# Patient Record
Sex: FEMALE | Race: WHITE | NOT HISPANIC OR LATINO | URBAN - METROPOLITAN AREA
[De-identification: names, ages, dates, MRNs, and addresses within clinical notes are randomized per-mention and may not be internally consistent; named-entity substitution may affect disease eponyms.]

---

## 2020-04-02 ENCOUNTER — INPATIENT (INPATIENT)
Facility: HOSPITAL | Age: 55
LOS: 2 days | Discharge: HOME | DRG: 177 | End: 2020-04-05
Attending: STUDENT IN AN ORGANIZED HEALTH CARE EDUCATION/TRAINING PROGRAM | Admitting: STUDENT IN AN ORGANIZED HEALTH CARE EDUCATION/TRAINING PROGRAM
Payer: COMMERCIAL

## 2020-04-02 VITALS
TEMPERATURE: 100 F | DIASTOLIC BLOOD PRESSURE: 62 MMHG | OXYGEN SATURATION: 95 % | RESPIRATION RATE: 20 BRPM | HEART RATE: 111 BPM | SYSTOLIC BLOOD PRESSURE: 119 MMHG

## 2020-04-02 DIAGNOSIS — R19.7 DIARRHEA, UNSPECIFIED: ICD-10-CM

## 2020-04-02 DIAGNOSIS — R50.9 FEVER, UNSPECIFIED: ICD-10-CM

## 2020-04-02 DIAGNOSIS — U07.1 COVID-19: ICD-10-CM

## 2020-04-02 DIAGNOSIS — B97.21 SARS-ASSOCIATED CORONAVIRUS AS THE CAUSE OF DISEASES CLASSIFIED ELSEWHERE: ICD-10-CM

## 2020-04-02 DIAGNOSIS — J96.91 RESPIRATORY FAILURE, UNSPECIFIED WITH HYPOXIA: ICD-10-CM

## 2020-04-02 DIAGNOSIS — J12.89 OTHER VIRAL PNEUMONIA: ICD-10-CM

## 2020-04-02 DIAGNOSIS — F41.9 ANXIETY DISORDER, UNSPECIFIED: ICD-10-CM

## 2020-04-02 LAB
ALBUMIN SERPL ELPH-MCNC: 3.3 G/DL — SIGNIFICANT CHANGE UP (ref 3.3–5)
ALBUMIN SERPL ELPH-MCNC: 3.8 G/DL — SIGNIFICANT CHANGE UP (ref 3.3–5)
ALP SERPL-CCNC: 66 U/L — SIGNIFICANT CHANGE UP (ref 40–120)
ALP SERPL-CCNC: 78 U/L — SIGNIFICANT CHANGE UP (ref 40–120)
ALT FLD-CCNC: 139 U/L — HIGH (ref 10–45)
ALT FLD-CCNC: 178 U/L — HIGH (ref 10–45)
ANION GAP SERPL CALC-SCNC: 11 MMOL/L — SIGNIFICANT CHANGE UP (ref 5–17)
ANION GAP SERPL CALC-SCNC: 12 MMOL/L — SIGNIFICANT CHANGE UP (ref 5–17)
AST SERPL-CCNC: 107 U/L — HIGH (ref 10–40)
AST SERPL-CCNC: 130 U/L — HIGH (ref 10–40)
BASE EXCESS BLDV CALC-SCNC: 3.9 MMOL/L — SIGNIFICANT CHANGE UP
BASOPHILS # BLD AUTO: 0 K/UL — SIGNIFICANT CHANGE UP (ref 0–0.2)
BASOPHILS # BLD AUTO: 0.01 K/UL — SIGNIFICANT CHANGE UP (ref 0–0.2)
BASOPHILS NFR BLD AUTO: 0 % — SIGNIFICANT CHANGE UP (ref 0–2)
BASOPHILS NFR BLD AUTO: 0.3 % — SIGNIFICANT CHANGE UP (ref 0–2)
BILIRUB SERPL-MCNC: 0.5 MG/DL — SIGNIFICANT CHANGE UP (ref 0.2–1.2)
BILIRUB SERPL-MCNC: 0.5 MG/DL — SIGNIFICANT CHANGE UP (ref 0.2–1.2)
BUN SERPL-MCNC: 8 MG/DL — SIGNIFICANT CHANGE UP (ref 7–23)
BUN SERPL-MCNC: 9 MG/DL — SIGNIFICANT CHANGE UP (ref 7–23)
CA-I SERPL-SCNC: 1.13 MMOL/L — SIGNIFICANT CHANGE UP (ref 1.12–1.3)
CALCIUM SERPL-MCNC: 8 MG/DL — LOW (ref 8.4–10.5)
CALCIUM SERPL-MCNC: 8.6 MG/DL — SIGNIFICANT CHANGE UP (ref 8.4–10.5)
CHLORIDE SERPL-SCNC: 102 MMOL/L — SIGNIFICANT CHANGE UP (ref 96–108)
CHLORIDE SERPL-SCNC: 96 MMOL/L — SIGNIFICANT CHANGE UP (ref 96–108)
CO2 SERPL-SCNC: 24 MMOL/L — SIGNIFICANT CHANGE UP (ref 22–31)
CO2 SERPL-SCNC: 27 MMOL/L — SIGNIFICANT CHANGE UP (ref 22–31)
CREAT SERPL-MCNC: 0.63 MG/DL — SIGNIFICANT CHANGE UP (ref 0.5–1.3)
CREAT SERPL-MCNC: 0.74 MG/DL — SIGNIFICANT CHANGE UP (ref 0.5–1.3)
CRP SERPL-MCNC: 10.57 MG/DL — HIGH (ref 0–0.4)
D DIMER BLD IA.RAPID-MCNC: 217 NG/ML DDU — SIGNIFICANT CHANGE UP
D DIMER BLD IA.RAPID-MCNC: 217 NG/ML DDU — SIGNIFICANT CHANGE UP
EOSINOPHIL # BLD AUTO: 0 K/UL — SIGNIFICANT CHANGE UP (ref 0–0.5)
EOSINOPHIL # BLD AUTO: 0 K/UL — SIGNIFICANT CHANGE UP (ref 0–0.5)
EOSINOPHIL NFR BLD AUTO: 0 % — SIGNIFICANT CHANGE UP (ref 0–6)
EOSINOPHIL NFR BLD AUTO: 0 % — SIGNIFICANT CHANGE UP (ref 0–6)
FERRITIN SERPL-MCNC: 1279 NG/ML — HIGH (ref 15–150)
FERRITIN SERPL-MCNC: 1656 NG/ML — HIGH (ref 15–150)
GAS PNL BLDV: 133 MMOL/L — LOW (ref 138–146)
GAS PNL BLDV: SIGNIFICANT CHANGE UP
GAS PNL BLDV: SIGNIFICANT CHANGE UP
GLUCOSE SERPL-MCNC: 150 MG/DL — HIGH (ref 70–99)
GLUCOSE SERPL-MCNC: 167 MG/DL — HIGH (ref 70–99)
HCO3 BLDV-SCNC: 29 MMOL/L — HIGH (ref 20–27)
HCT VFR BLD CALC: 36.3 % — SIGNIFICANT CHANGE UP (ref 34.5–45)
HCT VFR BLD CALC: 42.2 % — SIGNIFICANT CHANGE UP (ref 34.5–45)
HGB BLD-MCNC: 11.5 G/DL — SIGNIFICANT CHANGE UP (ref 11.5–15.5)
HGB BLD-MCNC: 13.4 G/DL — SIGNIFICANT CHANGE UP (ref 11.5–15.5)
IMM GRANULOCYTES NFR BLD AUTO: 0.5 % — SIGNIFICANT CHANGE UP (ref 0–1.5)
LACTATE SERPL-SCNC: 1.3 MMOL/L — SIGNIFICANT CHANGE UP (ref 0.5–2)
LYMPHOCYTES # BLD AUTO: 0.34 K/UL — LOW (ref 1–3.3)
LYMPHOCYTES # BLD AUTO: 0.95 K/UL — LOW (ref 1–3.3)
LYMPHOCYTES # BLD AUTO: 25.2 % — SIGNIFICANT CHANGE UP (ref 13–44)
LYMPHOCYTES # BLD AUTO: 7.9 % — LOW (ref 13–44)
MAGNESIUM SERPL-MCNC: 2 MG/DL — SIGNIFICANT CHANGE UP (ref 1.6–2.6)
MCHC RBC-ENTMCNC: 29.2 PG — SIGNIFICANT CHANGE UP (ref 27–34)
MCHC RBC-ENTMCNC: 29.2 PG — SIGNIFICANT CHANGE UP (ref 27–34)
MCHC RBC-ENTMCNC: 31.7 GM/DL — LOW (ref 32–36)
MCHC RBC-ENTMCNC: 31.8 GM/DL — LOW (ref 32–36)
MCV RBC AUTO: 91.9 FL — SIGNIFICANT CHANGE UP (ref 80–100)
MCV RBC AUTO: 92.1 FL — SIGNIFICANT CHANGE UP (ref 80–100)
MONOCYTES # BLD AUTO: 0.08 K/UL — SIGNIFICANT CHANGE UP (ref 0–0.9)
MONOCYTES # BLD AUTO: 0.09 K/UL — SIGNIFICANT CHANGE UP (ref 0–0.9)
MONOCYTES NFR BLD AUTO: 1.8 % — LOW (ref 2–14)
MONOCYTES NFR BLD AUTO: 2.4 % — SIGNIFICANT CHANGE UP (ref 2–14)
NEUTROPHILS # BLD AUTO: 2.7 K/UL — SIGNIFICANT CHANGE UP (ref 1.8–7.4)
NEUTROPHILS # BLD AUTO: 3.76 K/UL — SIGNIFICANT CHANGE UP (ref 1.8–7.4)
NEUTROPHILS NFR BLD AUTO: 71.6 % — SIGNIFICANT CHANGE UP (ref 43–77)
NEUTROPHILS NFR BLD AUTO: 84.1 % — HIGH (ref 43–77)
NRBC # BLD: 0 /100 WBCS — SIGNIFICANT CHANGE UP (ref 0–0)
PCO2 BLDV: 47 MMHG — SIGNIFICANT CHANGE UP (ref 41–51)
PH BLDV: 7.41 — SIGNIFICANT CHANGE UP (ref 7.32–7.43)
PHOSPHATE SERPL-MCNC: 3.1 MG/DL — SIGNIFICANT CHANGE UP (ref 2.5–4.5)
PLATELET # BLD AUTO: 107 K/UL — LOW (ref 150–400)
PLATELET # BLD AUTO: 122 K/UL — LOW (ref 150–400)
PO2 BLDV: 18 MMHG — SIGNIFICANT CHANGE UP
POTASSIUM BLDV-SCNC: 3.6 MMOL/L — SIGNIFICANT CHANGE UP (ref 3.5–4.9)
POTASSIUM SERPL-MCNC: 3.8 MMOL/L — SIGNIFICANT CHANGE UP (ref 3.5–5.3)
POTASSIUM SERPL-MCNC: 3.8 MMOL/L — SIGNIFICANT CHANGE UP (ref 3.5–5.3)
POTASSIUM SERPL-SCNC: 3.8 MMOL/L — SIGNIFICANT CHANGE UP (ref 3.5–5.3)
POTASSIUM SERPL-SCNC: 3.8 MMOL/L — SIGNIFICANT CHANGE UP (ref 3.5–5.3)
PROT SERPL-MCNC: 7 G/DL — SIGNIFICANT CHANGE UP (ref 6–8.3)
PROT SERPL-MCNC: 7.8 G/DL — SIGNIFICANT CHANGE UP (ref 6–8.3)
RBC # BLD: 3.94 M/UL — SIGNIFICANT CHANGE UP (ref 3.8–5.2)
RBC # BLD: 4.59 M/UL — SIGNIFICANT CHANGE UP (ref 3.8–5.2)
RBC # FLD: 13.2 % — SIGNIFICANT CHANGE UP (ref 10.3–14.5)
RBC # FLD: 13.4 % — SIGNIFICANT CHANGE UP (ref 10.3–14.5)
SAO2 % BLDV: 21 % — SIGNIFICANT CHANGE UP
SARS-COV-2 RNA SPEC QL NAA+PROBE: DETECTED
SODIUM SERPL-SCNC: 134 MMOL/L — LOW (ref 135–145)
SODIUM SERPL-SCNC: 138 MMOL/L — SIGNIFICANT CHANGE UP (ref 135–145)
TROPONIN T SERPL-MCNC: <0.01 NG/ML — SIGNIFICANT CHANGE UP (ref 0–0.01)
WBC # BLD: 3.77 K/UL — LOW (ref 3.8–10.5)
WBC # BLD: 4.25 K/UL — SIGNIFICANT CHANGE UP (ref 3.8–10.5)
WBC # FLD AUTO: 3.77 K/UL — LOW (ref 3.8–10.5)
WBC # FLD AUTO: 4.25 K/UL — SIGNIFICANT CHANGE UP (ref 3.8–10.5)

## 2020-04-02 PROCEDURE — 99232 SBSQ HOSP IP/OBS MODERATE 35: CPT

## 2020-04-02 PROCEDURE — 99285 EMERGENCY DEPT VISIT HI MDM: CPT

## 2020-04-02 PROCEDURE — 71045 X-RAY EXAM CHEST 1 VIEW: CPT | Mod: 26

## 2020-04-02 RX ORDER — AZITHROMYCIN 500 MG/1
500 TABLET, FILM COATED ORAL ONCE
Refills: 0 | Status: COMPLETED | OUTPATIENT
Start: 2020-04-02 | End: 2020-04-02

## 2020-04-02 RX ORDER — ACETAMINOPHEN 500 MG
650 TABLET ORAL EVERY 6 HOURS
Refills: 0 | Status: DISCONTINUED | OUTPATIENT
Start: 2020-04-02 | End: 2020-04-05

## 2020-04-02 RX ORDER — HYDROXYCHLOROQUINE SULFATE 200 MG
400 TABLET ORAL EVERY 12 HOURS
Refills: 0 | Status: COMPLETED | OUTPATIENT
Start: 2020-04-02 | End: 2020-04-02

## 2020-04-02 RX ORDER — ASCORBIC ACID 60 MG
1500 TABLET,CHEWABLE ORAL ONCE
Refills: 0 | Status: DISCONTINUED | OUTPATIENT
Start: 2020-04-02 | End: 2020-04-02

## 2020-04-02 RX ORDER — ENOXAPARIN SODIUM 100 MG/ML
40 INJECTION SUBCUTANEOUS DAILY
Refills: 0 | Status: DISCONTINUED | OUTPATIENT
Start: 2020-04-02 | End: 2020-04-05

## 2020-04-02 RX ORDER — SODIUM CHLORIDE 9 MG/ML
1000 INJECTION INTRAMUSCULAR; INTRAVENOUS; SUBCUTANEOUS ONCE
Refills: 0 | Status: COMPLETED | OUTPATIENT
Start: 2020-04-02 | End: 2020-04-02

## 2020-04-02 RX ORDER — THIAMINE MONONITRATE (VIT B1) 100 MG
200 TABLET ORAL ONCE
Refills: 0 | Status: COMPLETED | OUTPATIENT
Start: 2020-04-02 | End: 2020-04-02

## 2020-04-02 RX ORDER — FAMOTIDINE 10 MG/ML
20 INJECTION INTRAVENOUS DAILY
Refills: 0 | Status: DISCONTINUED | OUTPATIENT
Start: 2020-04-02 | End: 2020-04-05

## 2020-04-02 RX ORDER — ASCORBIC ACID 60 MG
1500 TABLET,CHEWABLE ORAL
Refills: 0 | Status: DISCONTINUED | OUTPATIENT
Start: 2020-04-02 | End: 2020-04-02

## 2020-04-02 RX ORDER — THIAMINE MONONITRATE (VIT B1) 100 MG
200 TABLET ORAL
Refills: 0 | Status: DISCONTINUED | OUTPATIENT
Start: 2020-04-02 | End: 2020-04-03

## 2020-04-02 RX ORDER — HYDROXYCHLOROQUINE SULFATE 200 MG
200 TABLET ORAL EVERY 12 HOURS
Refills: 0 | Status: DISCONTINUED | OUTPATIENT
Start: 2020-04-03 | End: 2020-04-05

## 2020-04-02 RX ORDER — ASCORBIC ACID 60 MG
1500 TABLET,CHEWABLE ORAL
Refills: 0 | Status: DISCONTINUED | OUTPATIENT
Start: 2020-04-02 | End: 2020-04-03

## 2020-04-02 RX ORDER — HYDROXYCHLOROQUINE SULFATE 200 MG
TABLET ORAL
Refills: 0 | Status: DISCONTINUED | OUTPATIENT
Start: 2020-04-02 | End: 2020-04-05

## 2020-04-02 RX ORDER — THIAMINE MONONITRATE (VIT B1) 100 MG
200 TABLET ORAL
Refills: 0 | Status: DISCONTINUED | OUTPATIENT
Start: 2020-04-02 | End: 2020-04-02

## 2020-04-02 RX ORDER — ASCORBIC ACID 60 MG
1500 TABLET,CHEWABLE ORAL ONCE
Refills: 0 | Status: COMPLETED | OUTPATIENT
Start: 2020-04-02 | End: 2020-04-02

## 2020-04-02 RX ORDER — POTASSIUM CHLORIDE 20 MEQ
20 PACKET (EA) ORAL ONCE
Refills: 0 | Status: COMPLETED | OUTPATIENT
Start: 2020-04-02 | End: 2020-04-02

## 2020-04-02 RX ORDER — AZITHROMYCIN 500 MG/1
250 TABLET, FILM COATED ORAL DAILY
Refills: 0 | Status: COMPLETED | OUTPATIENT
Start: 2020-04-02 | End: 2020-04-05

## 2020-04-02 RX ORDER — ACETAMINOPHEN 500 MG
1000 TABLET ORAL ONCE
Refills: 0 | Status: COMPLETED | OUTPATIENT
Start: 2020-04-02 | End: 2020-04-02

## 2020-04-02 RX ADMIN — SODIUM CHLORIDE 333.33 MILLILITER(S): 9 INJECTION INTRAMUSCULAR; INTRAVENOUS; SUBCUTANEOUS at 01:56

## 2020-04-02 RX ADMIN — Medication 650 MILLIGRAM(S): at 20:30

## 2020-04-02 RX ADMIN — Medication 153 MILLIGRAM(S): at 03:29

## 2020-04-02 RX ADMIN — ENOXAPARIN SODIUM 40 MILLIGRAM(S): 100 INJECTION SUBCUTANEOUS at 12:17

## 2020-04-02 RX ADMIN — Medication 100 MILLIGRAM(S): at 09:54

## 2020-04-02 RX ADMIN — Medication 200 MILLIGRAM(S): at 17:45

## 2020-04-02 RX ADMIN — Medication 1500 MILLIGRAM(S): at 23:28

## 2020-04-02 RX ADMIN — Medication 400 MILLIGRAM(S): at 17:45

## 2020-04-02 RX ADMIN — Medication 100 MILLIGRAM(S): at 14:19

## 2020-04-02 RX ADMIN — Medication 20 MILLIEQUIVALENT(S): at 12:16

## 2020-04-02 RX ADMIN — AZITHROMYCIN 250 MILLIGRAM(S): 500 TABLET, FILM COATED ORAL at 12:23

## 2020-04-02 RX ADMIN — Medication 200 MILLIGRAM(S): at 02:49

## 2020-04-02 RX ADMIN — SODIUM CHLORIDE 1000 MILLILITER(S): 9 INJECTION INTRAMUSCULAR; INTRAVENOUS; SUBCUTANEOUS at 02:49

## 2020-04-02 RX ADMIN — AZITHROMYCIN 500 MILLIGRAM(S): 500 TABLET, FILM COATED ORAL at 03:49

## 2020-04-02 RX ADMIN — Medication 400 MILLIGRAM(S): at 07:55

## 2020-04-02 RX ADMIN — Medication 1500 MILLIGRAM(S): at 12:16

## 2020-04-02 RX ADMIN — Medication 1500 MILLIGRAM(S): at 17:45

## 2020-04-02 RX ADMIN — AZITHROMYCIN 255 MILLIGRAM(S): 500 TABLET, FILM COATED ORAL at 02:45

## 2020-04-02 RX ADMIN — Medication 1000 MILLIGRAM(S): at 01:56

## 2020-04-02 RX ADMIN — Medication 650 MILLIGRAM(S): at 09:54

## 2020-04-02 NOTE — ED PROVIDER NOTE - PHYSICAL EXAMINATION
VITAL SIGNS: I have reviewed nursing notes and confirm.  CONSTITUTIONAL: Mildly weak appearing  SKIN:  warm and dry, no acute rash.   HEAD:  normocephalic, atraumatic.  EYES: EOM intact; conjunctiva and sclera clear.  ENT: No nasal discharge; airway clear.   NECK: Supple; non tender.  CARD: Tachycardic rate and regular rhythm.   RESP:  Clear to auscultation b/l, no wheezes, rales or rhonchi.  ABD: Normal bowel sounds; soft; non-distended; non-tender; no guarding/ rebound.  EXT: Normal ROM. No clubbing, cyanosis or edema. 2+ pulses to b/l ue/le.  NEURO: Alert, oriented, grossly unremarkable  PSYCH: Cooperative, mood and affect appropriate.

## 2020-04-02 NOTE — H&P ADULT - PROBLEM SELECTOR PLAN 1
-COVID-19 Positive.   -f/u procalcitonin to eval for bacterial infection  -f/u LDH, CRP, ferritin, d-dimer, CK, G6PD, QuantiFeron  -contact/airborne precaution  -tylenol PRN for fever  -Robitussin PRN for cough  -avoid NSAIDs  -no nebulizers, MDI only  -will hold off on abx for now pending further w/u  -will eval for HIV  -f/u blood cx  -COVID protocol: ascorbic acid, thiamine, azithromycin, famotidine -COVID-19 Positive.   -f/u procalcitonin to eval for bacterial infection  -f/u LDH, CRP, ferritin, d-dimer, CK, G6PD, QuantiFeron  -contact/airborne precaution  -tylenol PRN for fever  -Tessalon Perls PRN for cough  -avoid NSAIDs  -no nebulizers, MDI only  -will hold off on abx for now pending further w/u  -will eval for HIV  -f/u blood cx  -COVID protocol: ascorbic acid, thiamine, azithromycin, famotidine -COVID-19 Positive.   -f/u procalcitonin to eval for bacterial infection  -f/u LDH, CRP, ferritin, d-dimer, CK, G6PD, QuantiFeron  -contact/airborne precaution  -tylenol PRN for fever  -Tessalon Perls PRN for cough  -avoid NSAIDs  -no nebulizers, MDI only  -will eval for HIV  -f/u blood cx  -COVID protocol: ascorbic acid, thiamine, azithromycin, famotidine

## 2020-04-02 NOTE — DISCHARGE NOTE PROVIDER - HOSPITAL COURSE
#Discharge: do not delete        Patient is a 56yo F with no past medical history    Presented with cough fever and SOB, found to have COVID-19 pneumonia    Problem List/Main Diagnoses (system-based):     #        Inpatient treatment course:     55F w/ no PMH reporting to ED for worsening cough, fever, and SOB on exertion.  Patient states symptoms started approx 5 days ago with fever which waxed and waned. Patient was taking Tylenol to control fevers.  Patient also endorsing cough which started two days ago which has worsened and the main reason she came into the ER, as well as the SOB on exertion.  She saw a telemed dr who prescribed her some Azithromycin; she has taken 1 dose.   Patient denies nausea, vomiting, abdominal pain, diarrhea/constipation, loss of appetite, vision changes, severe headache, chest pain, back pain.        New medications:         Labs to be followed outpatient: None    Exam to be followed outpatient: None #Discharge: do not delete        Patient is a 54yo F with no past medical history    Presented with cough fever and SOB, found to have COVID-19 pneumonia    Problem List/Main Diagnoses (system-based):     Infection due to 2019 novel coronavirus.  Plan: -COVID-19 Positive.     -contact/airborne precaution    -tylenol PRN for fever    -Tessalon Perls and guaifenesin PRN for cough    -avoid NSAIDs    -Albuterol HFA PRN    -blood cx no growth to date    -COVID protocol: azithromycin (started 4/1 by PCP), plaquenil (started 4/2).         Inpatient treatment course:     55F w/ no PMH reporting to ED for worsening cough, fever, and SOB on exertion.  Patient states symptoms started approx 5 days ago with fever which waxed and waned. Patient was taking Tylenol to control fevers.  Patient also endorsing cough which started two days ago which has worsened and the main reason she came into the ER, as well as the SOB on exertion.  She saw a telemed dr who prescribed her some Azithromycin; she has taken 1 dose.   Patient denies nausea, vomiting, abdominal pain, diarrhea/constipation, loss of appetite, vision changes, severe headache, chest pain, back pain. Patient received 5 day course of azithromycin and to finish her 5 day course of plaquenil at [facility]. patient sating 92% on RA on day of discharge        New medications: plaquenil 200 mg two times a day for 1.5  more days    Labs to be followed outpatient: None    Exam to be followed outpatient: None #Discharge: do not delete        Patient is a 54yo F with no past medical history    Presented with cough fever and SOB, found to have COVID-19 pneumonia    Problem List/Main Diagnoses (system-based):     Infection due to 2019 novel coronavirus.  Plan: -COVID-19 Positive.     -contact/airborne precaution    -tylenol PRN for fever    -Tessalon Perls and guaifenesin PRN for cough    -avoid NSAIDs    -Albuterol HFA PRN    -blood cx n1 aerobic bottle w/ staph hominis growth, likely contaminant as it is skin ludmila    -COVID protocol: azithromycin (started 4/1 by PCP), plaquenil (started 4/2).         Inpatient treatment course:     55F w/ no PMH reporting to ED for worsening cough, fever, and SOB on exertion.  Patient states symptoms started approx 5 days ago with fever which waxed and waned. Patient was taking Tylenol to control fevers.  Patient also endorsing cough which started two days ago which has worsened and the main reason she came into the ER, as well as the SOB on exertion.  She saw a telemed dr who prescribed her some Azithromycin; she has taken 1 dose.   Patient denies nausea, vomiting, abdominal pain, diarrhea/constipation, loss of appetite, vision changes, severe headache, chest pain, back pain. Patient received 5 day course of azithromycin and to finish her 5 day course of plaquenil at [facility]. patient sating 92% on RA on day of discharge. Of note, 1 aerobic bottle grew staph hominis, a likely contaminant as it is skin ludmila.        New medications: plaquenil 200 mg two times a day for 1.5  more days    Labs to be followed outpatient: None    Exam to be followed outpatient: None Patient is a 56yo F with no past medical history    Presented with cough fever and SOB, found to have COVID-19 pneumonia    Problem List/Main Diagnoses (system-based):     Infection due to 2019 novel coronavirus.  Plan: -COVID-19 Positive.     -contact/airborne precaution    -tylenol PRN for fever    -Tessalon Perls and guaifenesin PRN for cough    -avoid NSAIDs    -Albuterol HFA PRN    -blood cx n1 aerobic bottle w/ staph hominis growth, likely contaminant as it is skin ludmila    -COVID protocol: azithromycin (started 4/1 by PCP), plaquenil (started 4/2).         Inpatient treatment course:     55F w/ no PMH reporting to ED for worsening cough, fever, and SOB on exertion.  Patient states symptoms started approx 5 days ago with fever which waxed and waned. Patient was taking Tylenol to control fevers.  Patient also endorsing cough which started two days ago which has worsened and the main reason she came into the ER, as well as the SOB on exertion.  She saw a telemed dr who prescribed her some Azithromycin; she has taken 1 dose.   Patient denies nausea, vomiting, abdominal pain, diarrhea/constipation, loss of appetite, vision changes, severe headache, chest pain, back pain. Patient received 5 day course of azithromycin and to finish her 5 day course of plaquenil at [facility]. patient sating 92% on RA on day of discharge. Of note, 1 aerobic bottle grew staph hominis, a likely contaminant as it is skin ludmila.        New medications: plaquenil 200 mg two times a day for 1.5  more days    Labs to be followed outpatient: None    Exam to be followed outpatient: None

## 2020-04-02 NOTE — ED PROVIDER NOTE - CLINICAL SUMMARY MEDICAL DECISION MAKING FREE TEXT BOX
56 y/o healthy p/w fevers, cough, and SOB. O2 sat 89% on RA. Will perform a full COVID workup including labs, Tylenol for fever and CXR. Will evaluate walking pulse and d/c home based on results. 56 y/o healthy p/w fevers, cough, and SOB. O2 sat 89% on RA. Will perform a full COVID workup including labs, Tylenol for fever and CXR. Will evaluate walking pulse and d/c home based on results.  Pt with 88% RA sat, xray with bilateral pneumonia.  Based on xray and O2 saturation will require admission.  COVID treatment started in ED.

## 2020-04-02 NOTE — H&P ADULT - HISTORY OF PRESENT ILLNESS
HPI: Patient is a 55F w/ no PMH reporting to ED for worsening cough, fever, and SOB on exertion.  Patient states symptoms started approx 5 days ago with fever which waxed and waned. Patient was taking Tylenol to control fevers.  Patient also endorsing cough which started two days ago which has worsened and the main reason she came into the ER, as well as the SOB on exertion.  She saw a telemed dr who prescribed her some Azithromycin; she has taken 1 dose.   Patient denies nausea, vomiting, abdominal pain, diarrhea/constipation, loss of appetite, vision changes, severe headache, chest pain, back pain.    Date of onset of symptoms: 5 days ago (3/29/2020)  Recent Travel: No  Sick Contacts: Possibly at a store  COVID Exposure: No  Close Contacts: No    ROS:  Fevers: Yes  Malaise: Yes  Myalgias: No  Abd Discomfort/pain: No  SOB: Yes          At rest: No         With exertion: Yes         At baseline: No  Cough: Yes        Productive: No  Nausea: No  Vomiting: No  Diarrhea: No    Vital Signs Last 24 Hrs  T(C): 37.1 (02 Apr 2020 03:20), Max: 37.7 (02 Apr 2020 01:17)  T(F): 98.7 (02 Apr 2020 03:20), Max: 99.8 (02 Apr 2020 01:17)  HR: 92 (02 Apr 2020 03:20) (90 - 111)  BP: 127/63 (02 Apr 2020 03:20) (92/56 - 127/63)  BP(mean): --  RR: 18 (02 Apr 2020 03:20) (18 - 20)  SpO2: 96% (02 Apr 2020 03:20) (88% - 98%)  I&O's Summary        LABS:                        13.4   3.77  )-----------( 122      ( 02 Apr 2020 01:57 )             42.2     04-02    134<L>  |  96  |  9   ----------------------------<  150<H>  3.8   |  27  |  0.74    Ca    8.6      02 Apr 2020 01:57    TPro  7.8  /  Alb  3.8  /  TBili  0.5  /  DBili  x   /  AST  130<H>  /  ALT  178<H>  /  AlkPhos  78  04-02

## 2020-04-02 NOTE — DISCHARGE NOTE PROVIDER - NSDCMRMEDTOKEN_GEN_ALL_CORE_FT
acetaminophen 325 mg oral tablet: 2 tab(s) orally every 6 hours, As needed, Temp greater or equal to 38C (100.4F), Mild Pain (1 - 3)  hydroxychloroquine 200 mg oral tablet: 1 tab(s) orally 2 times a day

## 2020-04-02 NOTE — DISCHARGE NOTE PROVIDER - NSDCCPCAREPLAN_GEN_ALL_CORE_FT
PRINCIPAL DISCHARGE DIAGNOSIS  Diagnosis: Infection due to 2019 novel coronavirus  Assessment and Plan of Treatment: You were diagnosed with the new COVID-19 coronavirus infection via a nasal swab. The treatment for this infection is supportive care, which includes: rest, maintaining adequate oral intake of food and water, and taking acetaminophen/tylenol for fever. Please maintain a strict home quarantine for 14 days at home, and wear a surgical mask at all times when you need to be in close proximity with another human being. Take tylenol as needed, every 6 hours, with a maximum daily dose of 4,000 mg a day. Please visit your nearest urgent care or emergency department should you start to experience: severe shortness of breath, severe cough/wheezing/difficulty breathing, or fever >103 for 3 days. Please maintain a good healthy diet. If you have any questions, please call your primary care provider.

## 2020-04-02 NOTE — ED PROVIDER NOTE - NS_EDPROVIDERDISPOUSERTYPE_ED_A_ED
I have personally evaluated and examined the patient. The Attending was available to me as a supervising provider if needed. I have personally evaluated and examined the patient. The Attending was available to me as a supervising provider if needed./Scribe Attestation (For Scribes USE Only)... Scribe Attestation (For Scribes USE Only).../I have personally evaluated and examined the patient. The Attending was available to me as a supervising provider if needed./Attending Attestation (For Attendings USE Only)...

## 2020-04-02 NOTE — H&P ADULT - ASSESSMENT
Patient is a 55F positive for COVID-19.  Plan to start COVID Quad Therapy, Ascorbic Acid 1500mg Q6, Thiamine 200mg BID, Azithromycin 250 Daily, and Plaquenil 400mg BID loading dose and 200mg BIDx 4 days. Patient is a 55F positive for COVID-19.  Plan to start COVID Quad Therapy, Ascorbic Acid 1500mg Q6, Thiamine 200mg BID, Azithromycin 250 Daily, and Plaquenil 400mg BID loading dose and 200mg BIDx 4 days.     Patient was sating at 93% on 4L NC during medicine interview. Increased to 5L NC which brought it up to 95%.  She is comfortable though her cough is what bother's her the most.

## 2020-04-02 NOTE — ED ADULT TRIAGE NOTE - BP NONINVASIVE SYSTOLIC (MM HG)
119 Bilobed Transposition Flap Text: The defect edges were debeveled with a #15 scalpel blade.  Given the location of the defect and the proximity to free margins a bilobed transposition flap was deemed most appropriate.  Using a sterile surgical marker, an appropriate bilobe flap drawn around the defect.    The area thus outlined was incised deep to adipose tissue with a #15 scalpel blade.  The skin margins were undermined to an appropriate distance in all directions utilizing iris scissors.

## 2020-04-02 NOTE — ED ADULT NURSE REASSESSMENT NOTE - NS ED NURSE REASSESS COMMENT FT1
pt. now planned admit, additional orders as noted, awaiting Vitamin C per Pharmacy, pt. utd on poc, with understanding verbalized

## 2020-04-02 NOTE — DISCHARGE NOTE PROVIDER - NSDCFUADDAPPT_GEN_ALL_CORE_FT
If you experience any worsening symptoms such as but not limited to: extensive fever (T >103F or for more than 3 days), shortness of breath, difficulty breathing, chest pain, please return to the emergency department for evaluation. After your home quarantine is complete and you have not had fevers, you are able to follow-up with your primary care physician.

## 2020-04-02 NOTE — ED ADULT TRIAGE NOTE - CHIEF COMPLAINT QUOTE
Presents to ED for SOB and cough x 4 days. Presents to ED for SOB and cough x 4 days.  SpO2 88-89% RA when EMS arrived, place on 4L NC which increased sat to 95%.

## 2020-04-02 NOTE — H&P ADULT - NSHPPHYSICALEXAM_GEN_ALL_CORE
VITAL SIGNS:  T(C): 37.1 (04-02-20 @ 03:20), Max: 37.7 (04-02-20 @ 01:17)  T(F): 98.7 (04-02-20 @ 03:20), Max: 99.8 (04-02-20 @ 01:17)  HR: 92 (04-02-20 @ 03:20) (90 - 111)  BP: 127/63 (04-02-20 @ 03:20) (92/56 - 127/63)  BP(mean): --  RR: 18 (04-02-20 @ 03:20) (18 - 20)  SpO2: 96% (04-02-20 @ 03:20) (88% - 98%)  Wt(kg): --    PHYSICAL EXAM:    Constitutional: WDWN appears uncomfortable in stretcher. Speaks full sentences.  Head: NC/AT  Eyes: PERRL, EOMI, anicteric sclera  ENT: no nasal discharge; uvula midline, no oropharyngeal erythema or exudates; MMM  Neck: supple; no JVD or thyromegaly  Respiratory:   Cardiac: +S1/S2; RRR; no M/R/G; PMI non-displaced  Gastrointestinal: soft, NT/ND; no rebound or guarding; +BSx4  Genitourinary: normal external genitalia  Back: spine midline, no bony tenderness or step-offs; no CVAT B/L  Extremities: WWP, no clubbing or cyanosis; no peripheral edema  Musculoskeletal: NROM x4; no joint swelling, tenderness or erythema  Vascular: 2+ radial, femoral, DP/PT pulses B/L  Dermatologic: skin warm, appears clammy.   Lymphatic: no submandibular or cervical LAD  Neurologic: AAOx3; CNII-XII grossly intact; no focal deficits  Psychiatric: affect and characteristics of appearance, verbalizations, behaviors are appropriate VITAL SIGNS:  T(C): 37.1 (04-02-20 @ 03:20), Max: 37.7 (04-02-20 @ 01:17)  T(F): 98.7 (04-02-20 @ 03:20), Max: 99.8 (04-02-20 @ 01:17)  HR: 92 (04-02-20 @ 03:20) (90 - 111)  BP: 127/63 (04-02-20 @ 03:20) (92/56 - 127/63)  BP(mean): --  RR: 18 (04-02-20 @ 03:20) (18 - 20)  SpO2: 96% (04-02-20 @ 03:20) (88% - 98%)  Wt(kg): --    PHYSICAL EXAM:    Constitutional: WDWN appears uncomfortable in stretcher. Speaks full sentences.  Head: NC/AT  Eyes: PERRL, EOMI, anicteric sclera  ENT: no nasal discharge; uvula midline, no oropharyngeal erythema or exudates; MMM  Neck: supple; no JVD or thyromegaly  Respiratory: Some crackles noted to b/l lower lobes. all other areas CTA.   Cardiac: +S1/S2; RRR; no M/R/G; PMI non-displaced  Gastrointestinal: soft, NT/ND; no rebound or guarding; +BSx4  Genitourinary: normal external genitalia  Back: spine midline, no bony tenderness or step-offs; no CVAT B/L  Extremities: WWP, no clubbing or cyanosis; no peripheral edema  Musculoskeletal: NROM x4; no joint swelling, tenderness or erythema  Vascular: 2+ radial, femoral, DP/PT pulses B/L  Dermatologic: skin warm, appears clammy.   Lymphatic: no submandibular or cervical LAD  Neurologic: AAOx3; CNII-XII grossly intact; no focal deficits  Psychiatric: affect and characteristics of appearance, verbalizations, behaviors are appropriate

## 2020-04-02 NOTE — ED PROVIDER NOTE - OBJECTIVE STATEMENT
56 y/o nonsmoker healthy F presents to the ED with c/o fever, cough, and worsening SOB x 5 D. Her PMD prescribed her a Zpack but her family was concerned because she looked weak and short of breath so she was advised to come to the hospital by her family. Denies Hx asthma.

## 2020-04-03 DIAGNOSIS — R19.7 DIARRHEA, UNSPECIFIED: ICD-10-CM

## 2020-04-03 LAB
-  COAGULASE NEGATIVE STAPHYLOCOCCUS: SIGNIFICANT CHANGE UP
ALBUMIN SERPL ELPH-MCNC: 3.6 G/DL — SIGNIFICANT CHANGE UP (ref 3.3–5)
ALP SERPL-CCNC: 80 U/L — SIGNIFICANT CHANGE UP (ref 40–120)
ALT FLD-CCNC: 108 U/L — HIGH (ref 10–45)
ANION GAP SERPL CALC-SCNC: 16 MMOL/L — SIGNIFICANT CHANGE UP (ref 5–17)
AST SERPL-CCNC: 74 U/L — HIGH (ref 10–40)
BILIRUB SERPL-MCNC: 0.5 MG/DL — SIGNIFICANT CHANGE UP (ref 0.2–1.2)
BUN SERPL-MCNC: 6 MG/DL — LOW (ref 7–23)
CALCIUM SERPL-MCNC: 9.3 MG/DL — SIGNIFICANT CHANGE UP (ref 8.4–10.5)
CHLORIDE SERPL-SCNC: 102 MMOL/L — SIGNIFICANT CHANGE UP (ref 96–108)
CO2 SERPL-SCNC: 23 MMOL/L — SIGNIFICANT CHANGE UP (ref 22–31)
CREAT SERPL-MCNC: 0.54 MG/DL — SIGNIFICANT CHANGE UP (ref 0.5–1.3)
CRP SERPL-MCNC: 19.53 MG/DL — HIGH (ref 0–0.4)
D DIMER BLD IA.RAPID-MCNC: 237 NG/ML DDU — HIGH
FERRITIN SERPL-MCNC: 994 NG/ML — HIGH (ref 15–150)
GLUCOSE SERPL-MCNC: 111 MG/DL — HIGH (ref 70–99)
GRAM STN FLD: SIGNIFICANT CHANGE UP
HCV AB S/CO SERPL IA: 0.1 S/CO — SIGNIFICANT CHANGE UP
HCV AB SERPL-IMP: SIGNIFICANT CHANGE UP
MAGNESIUM SERPL-MCNC: 2.2 MG/DL — SIGNIFICANT CHANGE UP (ref 1.6–2.6)
METHOD TYPE: SIGNIFICANT CHANGE UP
PHOSPHATE SERPL-MCNC: 3.9 MG/DL — SIGNIFICANT CHANGE UP (ref 2.5–4.5)
POTASSIUM SERPL-MCNC: 3.9 MMOL/L — SIGNIFICANT CHANGE UP (ref 3.5–5.3)
POTASSIUM SERPL-SCNC: 3.9 MMOL/L — SIGNIFICANT CHANGE UP (ref 3.5–5.3)
PROT SERPL-MCNC: 7.9 G/DL — SIGNIFICANT CHANGE UP (ref 6–8.3)
SODIUM SERPL-SCNC: 141 MMOL/L — SIGNIFICANT CHANGE UP (ref 135–145)

## 2020-04-03 PROCEDURE — 99232 SBSQ HOSP IP/OBS MODERATE 35: CPT

## 2020-04-03 RX ORDER — ALBUTEROL 90 UG/1
2 AEROSOL, METERED ORAL EVERY 6 HOURS
Refills: 0 | Status: DISCONTINUED | OUTPATIENT
Start: 2020-04-03 | End: 2020-04-05

## 2020-04-03 RX ADMIN — Medication 200 MILLIGRAM(S): at 06:16

## 2020-04-03 RX ADMIN — AZITHROMYCIN 250 MILLIGRAM(S): 500 TABLET, FILM COATED ORAL at 11:52

## 2020-04-03 RX ADMIN — Medication 100 MILLIGRAM(S): at 11:53

## 2020-04-03 RX ADMIN — Medication 100 MILLIGRAM(S): at 11:52

## 2020-04-03 RX ADMIN — Medication 1500 MILLIGRAM(S): at 04:39

## 2020-04-03 RX ADMIN — ENOXAPARIN SODIUM 40 MILLIGRAM(S): 100 INJECTION SUBCUTANEOUS at 11:53

## 2020-04-03 RX ADMIN — Medication 1500 MILLIGRAM(S): at 11:52

## 2020-04-03 RX ADMIN — Medication 200 MILLIGRAM(S): at 19:45

## 2020-04-03 RX ADMIN — ALBUTEROL 2 PUFF(S): 90 AEROSOL, METERED ORAL at 11:53

## 2020-04-03 RX ADMIN — FAMOTIDINE 20 MILLIGRAM(S): 10 INJECTION INTRAVENOUS at 11:52

## 2020-04-03 RX ADMIN — Medication 100 MILLIGRAM(S): at 04:39

## 2020-04-03 RX ADMIN — Medication 200 MILLIGRAM(S): at 04:39

## 2020-04-03 NOTE — PROGRESS NOTE ADULT - PROBLEM SELECTOR PLAN 1
-COVID-19 Positive.   -f/u procalcitonin to eval for bacterial infection  -4/3 , CRP 10.57, ferritin 1279, [id number]  -contact/airborne precaution  -tylenol PRN for fever  -Tessalon Perls PRN for cough  -avoid NSAIDs  -Albuterol nebulizer PRN  -f/u blood cx  -COVID protocol: ascorbic acid, thiamine, azithromycin, famotidine -COVID-19 Positive.   -f/u procalcitonin to eval for bacterial infection  -4/3 , CRP 10.57, ferritin 1279, [id number]  -contact/airborne precaution  -tylenol PRN for fever  -Tessalon Perls and guaifenesin PRN for cough  -avoid NSAIDs  -Albuterol HFA PRN  -blood cx no growth to date  -COVID protocol: ascorbic acid, thiamine, azithromycin, famotidine

## 2020-04-03 NOTE — PROGRESS NOTE ADULT - PROBLEM SELECTOR PLAN 2
-Monitor O2 sat continuously  -If O2 requirement continues to increase, consider starting steroids or immune modulators to prevent cytokine storm

## 2020-04-03 NOTE — GOALS OF CARE CONVERSATION - ADVANCED CARE PLANNING - CONVERSATION DETAILS
Discussed with patient regarding worsening respiratory symptoms, given that patient has been requiring higher oxygen requirements and now on nonrebreather. I discussed with the patient CPR and airway intubation - patient wishes to have both if her condition worsens.

## 2020-04-04 DIAGNOSIS — B34.2 CORONAVIRUS INFECTION, UNSPECIFIED: ICD-10-CM

## 2020-04-04 DIAGNOSIS — F41.8 OTHER SPECIFIED ANXIETY DISORDERS: ICD-10-CM

## 2020-04-04 LAB
ALBUMIN SERPL ELPH-MCNC: 3.7 G/DL — SIGNIFICANT CHANGE UP (ref 3.3–5)
ALP SERPL-CCNC: 75 U/L — SIGNIFICANT CHANGE UP (ref 40–120)
ALT FLD-CCNC: 86 U/L — HIGH (ref 10–45)
ANION GAP SERPL CALC-SCNC: 14 MMOL/L — SIGNIFICANT CHANGE UP (ref 5–17)
AST SERPL-CCNC: 61 U/L — HIGH (ref 10–40)
BASOPHILS # BLD AUTO: 0.01 K/UL — SIGNIFICANT CHANGE UP (ref 0–0.2)
BASOPHILS # BLD AUTO: 0.01 K/UL — SIGNIFICANT CHANGE UP (ref 0–0.2)
BASOPHILS NFR BLD AUTO: 0.2 % — SIGNIFICANT CHANGE UP (ref 0–2)
BASOPHILS NFR BLD AUTO: 0.2 % — SIGNIFICANT CHANGE UP (ref 0–2)
BILIRUB SERPL-MCNC: 0.5 MG/DL — SIGNIFICANT CHANGE UP (ref 0.2–1.2)
BUN SERPL-MCNC: 9 MG/DL — SIGNIFICANT CHANGE UP (ref 7–23)
CALCIUM SERPL-MCNC: 9 MG/DL — SIGNIFICANT CHANGE UP (ref 8.4–10.5)
CHLORIDE SERPL-SCNC: 101 MMOL/L — SIGNIFICANT CHANGE UP (ref 96–108)
CO2 SERPL-SCNC: 27 MMOL/L — SIGNIFICANT CHANGE UP (ref 22–31)
CREAT SERPL-MCNC: 0.65 MG/DL — SIGNIFICANT CHANGE UP (ref 0.5–1.3)
CRP SERPL-MCNC: 15.31 MG/DL — HIGH (ref 0–0.4)
D DIMER BLD IA.RAPID-MCNC: 431 NG/ML DDU — HIGH
EOSINOPHIL # BLD AUTO: 0 K/UL — SIGNIFICANT CHANGE UP (ref 0–0.5)
EOSINOPHIL # BLD AUTO: 0 K/UL — SIGNIFICANT CHANGE UP (ref 0–0.5)
EOSINOPHIL NFR BLD AUTO: 0 % — SIGNIFICANT CHANGE UP (ref 0–6)
EOSINOPHIL NFR BLD AUTO: 0 % — SIGNIFICANT CHANGE UP (ref 0–6)
FERRITIN SERPL-MCNC: 981 NG/ML — HIGH (ref 15–150)
GLUCOSE SERPL-MCNC: 95 MG/DL — SIGNIFICANT CHANGE UP (ref 70–99)
HCT VFR BLD CALC: 37.7 % — SIGNIFICANT CHANGE UP (ref 34.5–45)
HCT VFR BLD CALC: 40.4 % — SIGNIFICANT CHANGE UP (ref 34.5–45)
HGB BLD-MCNC: 12.1 G/DL — SIGNIFICANT CHANGE UP (ref 11.5–15.5)
HGB BLD-MCNC: 12.9 G/DL — SIGNIFICANT CHANGE UP (ref 11.5–15.5)
IMM GRANULOCYTES NFR BLD AUTO: 0.8 % — SIGNIFICANT CHANGE UP (ref 0–1.5)
IMM GRANULOCYTES NFR BLD AUTO: 1.3 % — SIGNIFICANT CHANGE UP (ref 0–1.5)
LYMPHOCYTES # BLD AUTO: 1.24 K/UL — SIGNIFICANT CHANGE UP (ref 1–3.3)
LYMPHOCYTES # BLD AUTO: 1.26 K/UL — SIGNIFICANT CHANGE UP (ref 1–3.3)
LYMPHOCYTES # BLD AUTO: 23.8 % — SIGNIFICANT CHANGE UP (ref 13–44)
LYMPHOCYTES # BLD AUTO: 27.5 % — SIGNIFICANT CHANGE UP (ref 13–44)
MAGNESIUM SERPL-MCNC: 2.2 MG/DL — SIGNIFICANT CHANGE UP (ref 1.6–2.6)
MCHC RBC-ENTMCNC: 29.3 PG — SIGNIFICANT CHANGE UP (ref 27–34)
MCHC RBC-ENTMCNC: 29.7 PG — SIGNIFICANT CHANGE UP (ref 27–34)
MCHC RBC-ENTMCNC: 31.9 GM/DL — LOW (ref 32–36)
MCHC RBC-ENTMCNC: 32.1 GM/DL — SIGNIFICANT CHANGE UP (ref 32–36)
MCV RBC AUTO: 91.6 FL — SIGNIFICANT CHANGE UP (ref 80–100)
MCV RBC AUTO: 92.6 FL — SIGNIFICANT CHANGE UP (ref 80–100)
MONOCYTES # BLD AUTO: 0.18 K/UL — SIGNIFICANT CHANGE UP (ref 0–0.9)
MONOCYTES # BLD AUTO: 0.19 K/UL — SIGNIFICANT CHANGE UP (ref 0–0.9)
MONOCYTES NFR BLD AUTO: 3.4 % — SIGNIFICANT CHANGE UP (ref 2–14)
MONOCYTES NFR BLD AUTO: 4.1 % — SIGNIFICANT CHANGE UP (ref 2–14)
NEUTROPHILS # BLD AUTO: 3.06 K/UL — SIGNIFICANT CHANGE UP (ref 1.8–7.4)
NEUTROPHILS # BLD AUTO: 3.75 K/UL — SIGNIFICANT CHANGE UP (ref 1.8–7.4)
NEUTROPHILS NFR BLD AUTO: 66.9 % — SIGNIFICANT CHANGE UP (ref 43–77)
NEUTROPHILS NFR BLD AUTO: 71.8 % — SIGNIFICANT CHANGE UP (ref 43–77)
NRBC # BLD: 0 /100 WBCS — SIGNIFICANT CHANGE UP (ref 0–0)
NRBC # BLD: 0 /100 WBCS — SIGNIFICANT CHANGE UP (ref 0–0)
PHOSPHATE SERPL-MCNC: 3.7 MG/DL — SIGNIFICANT CHANGE UP (ref 2.5–4.5)
PLATELET # BLD AUTO: 170 K/UL — SIGNIFICANT CHANGE UP (ref 150–400)
PLATELET # BLD AUTO: 171 K/UL — SIGNIFICANT CHANGE UP (ref 150–400)
POTASSIUM SERPL-MCNC: 4.1 MMOL/L — SIGNIFICANT CHANGE UP (ref 3.5–5.3)
POTASSIUM SERPL-SCNC: 4.1 MMOL/L — SIGNIFICANT CHANGE UP (ref 3.5–5.3)
PROT SERPL-MCNC: 7.4 G/DL — SIGNIFICANT CHANGE UP (ref 6–8.3)
RBC # BLD: 4.07 M/UL — SIGNIFICANT CHANGE UP (ref 3.8–5.2)
RBC # BLD: 4.41 M/UL — SIGNIFICANT CHANGE UP (ref 3.8–5.2)
RBC # FLD: 13.5 % — SIGNIFICANT CHANGE UP (ref 10.3–14.5)
RBC # FLD: 13.7 % — SIGNIFICANT CHANGE UP (ref 10.3–14.5)
SODIUM SERPL-SCNC: 142 MMOL/L — SIGNIFICANT CHANGE UP (ref 135–145)
WBC # BLD: 4.58 K/UL — SIGNIFICANT CHANGE UP (ref 3.8–10.5)
WBC # BLD: 5.22 K/UL — SIGNIFICANT CHANGE UP (ref 3.8–10.5)
WBC # FLD AUTO: 4.58 K/UL — SIGNIFICANT CHANGE UP (ref 3.8–10.5)
WBC # FLD AUTO: 5.22 K/UL — SIGNIFICANT CHANGE UP (ref 3.8–10.5)

## 2020-04-04 PROCEDURE — 99233 SBSQ HOSP IP/OBS HIGH 50: CPT | Mod: GC

## 2020-04-04 RX ORDER — CLONAZEPAM 1 MG
0.5 TABLET ORAL ONCE
Refills: 0 | Status: DISCONTINUED | OUTPATIENT
Start: 2020-04-04 | End: 2020-04-04

## 2020-04-04 RX ADMIN — Medication 200 MILLIGRAM(S): at 18:41

## 2020-04-04 RX ADMIN — FAMOTIDINE 20 MILLIGRAM(S): 10 INJECTION INTRAVENOUS at 12:21

## 2020-04-04 RX ADMIN — Medication 100 MILLIGRAM(S): at 22:23

## 2020-04-04 RX ADMIN — AZITHROMYCIN 250 MILLIGRAM(S): 500 TABLET, FILM COATED ORAL at 12:21

## 2020-04-04 RX ADMIN — Medication 200 MILLIGRAM(S): at 05:32

## 2020-04-04 RX ADMIN — Medication 0.5 MILLIGRAM(S): at 16:54

## 2020-04-04 RX ADMIN — Medication 100 MILLIGRAM(S): at 02:37

## 2020-04-04 RX ADMIN — ENOXAPARIN SODIUM 40 MILLIGRAM(S): 100 INJECTION SUBCUTANEOUS at 12:21

## 2020-04-04 RX ADMIN — Medication 100 MILLIGRAM(S): at 02:38

## 2020-04-04 NOTE — PROGRESS NOTE ADULT - PROBLEM SELECTOR PLAN 2
-Monitor O2 sat continuously  -will try to wean to NC today (satting >98% on NRB), consider steroids -Monitor O2 sat continuously  -weaned to 6L NC, sats 95%, cont to wean O2, no steroids as per  Baserdem  -RR cont to be elevated despite adequate O2 sats, will give Klonipin 0.5mg PO x 1 for anxiety, monitor RR

## 2020-04-04 NOTE — PROGRESS NOTE ADULT - SUBJECTIVE AND OBJECTIVE BOX
PA Adult Progress Note    Subjective Assessment: Patient seen and examined at bedside. Breathing comfortably on NRB.  	  MEDICATIONS:  azithromycin   Tablet 250 milliGRAM(s) Oral daily  hydroxychloroquine   Oral   hydroxychloroquine 200 milliGRAM(s) Oral every 12 hours  ALBUTerol    90 MICROgram(s) HFA Inhaler 2 Puff(s) Inhalation every 6 hours PRN  benzonatate 100 milliGRAM(s) Oral every 8 hours PRN  guaiFENesin   Syrup  (Sugar-Free) 100 milliGRAM(s) Oral every 6 hours PRN  acetaminophen   Tablet .. 650 milliGRAM(s) Oral every 6 hours PRN  famotidine    Tablet 20 milliGRAM(s) Oral daily  enoxaparin Injectable 40 milliGRAM(s) SubCutaneous daily    [PHYSICAL EXAM:  TELEMETRY:  T(C): 36.7 (04-03-20 @ 22:58), Max: 36.7 (04-03-20 @ 22:58)  HR: 99 (04-03-20 @ 22:58) (99 - 99)  BP: 112/73 (04-03-20 @ 22:58) (112/73 - 112/73)  RR: 20 (04-03-20 @ 22:58) (20 - 20)  SpO2: 99% (04-03-20 @ 22:58) (99% - 99%)                                           Appearance: NAD  HEENT:   Normal oral mucosa, PERRL, EOMI	  Neck: Supple, - JVD  Cardiovascular: Normal S1 S2, No JVD, No murmurs,   Respiratory: Lungs clear to auscultation  Gastrointestinal:  Soft, Non-tender, + BS	  Skin: No rashes, No ecchymoses, No cyanosis  Extremities: Normal range of motion, No clubbing, cyanosis or edema  Neurologic: Non-focal  Psychiatry: A & O x 3, Mood & affect appropriate      	    ECG:  	    LABS:	 	    04-04    142  |  101  |  9   ----------------------------<  95  4.1   |  27  |  0.65    Ca    9.0      04 Apr 2020 07:27  Phos  3.7     04-04  Mg     2.2     04-04    TPro  7.4  /  Alb  3.7  /  TBili  0.5  /  DBili  x   /  AST  61<H>  /  ALT  86<H>  /  AlkPhos  75  04-04

## 2020-04-04 NOTE — PROGRESS NOTE ADULT - PROBLEM SELECTOR PROBLEM 1
SARS-associated coronavirus as cause of disease classified elsewhere Infection due to 2019 novel coronavirus

## 2020-04-04 NOTE — PROGRESS NOTE ADULT - PROBLEM SELECTOR PLAN 1
-COVID-19 Positive.   -contact/airborne precaution  -tylenol PRN for fever  -Tessalon Perls and guaifenesin PRN for cough  -avoid NSAIDs  -Albuterol HFA PRN  -blood cx no growth to date  -COVID protocol: azithromycin (started 4/1 by PCP), plaquenil (started 4/2)

## 2020-04-05 VITALS — OXYGEN SATURATION: 94 %

## 2020-04-05 LAB
-  CEFAZOLIN: SIGNIFICANT CHANGE UP
-  CLINDAMYCIN: SIGNIFICANT CHANGE UP
-  ERYTHROMYCIN: SIGNIFICANT CHANGE UP
-  LINEZOLID: SIGNIFICANT CHANGE UP
-  OXACILLIN: SIGNIFICANT CHANGE UP
-  RIFAMPIN: SIGNIFICANT CHANGE UP
-  TRIMETHOPRIM/SULFAMETHOXAZOLE: SIGNIFICANT CHANGE UP
-  VANCOMYCIN: SIGNIFICANT CHANGE UP
ALBUMIN SERPL ELPH-MCNC: 3.5 G/DL — SIGNIFICANT CHANGE UP (ref 3.3–5)
ALP SERPL-CCNC: 73 U/L — SIGNIFICANT CHANGE UP (ref 40–120)
ALT FLD-CCNC: 78 U/L — HIGH (ref 10–45)
ANION GAP SERPL CALC-SCNC: 14 MMOL/L — SIGNIFICANT CHANGE UP (ref 5–17)
AST SERPL-CCNC: 67 U/L — HIGH (ref 10–40)
BILIRUB SERPL-MCNC: 0.5 MG/DL — SIGNIFICANT CHANGE UP (ref 0.2–1.2)
BUN SERPL-MCNC: 9 MG/DL — SIGNIFICANT CHANGE UP (ref 7–23)
CALCIUM SERPL-MCNC: 9.3 MG/DL — SIGNIFICANT CHANGE UP (ref 8.4–10.5)
CHLORIDE SERPL-SCNC: 101 MMOL/L — SIGNIFICANT CHANGE UP (ref 96–108)
CO2 SERPL-SCNC: 27 MMOL/L — SIGNIFICANT CHANGE UP (ref 22–31)
CREAT SERPL-MCNC: 0.67 MG/DL — SIGNIFICANT CHANGE UP (ref 0.5–1.3)
CRP SERPL-MCNC: 9.27 MG/DL — HIGH (ref 0–0.4)
D DIMER BLD IA.RAPID-MCNC: 979 NG/ML DDU — HIGH
FERRITIN SERPL-MCNC: 1009 NG/ML — HIGH (ref 15–150)
GAMMA INTERFERON BACKGROUND BLD IA-ACNC: 0.02 IU/ML — SIGNIFICANT CHANGE UP
GLUCOSE SERPL-MCNC: 123 MG/DL — HIGH (ref 70–99)
HCT VFR BLD CALC: 37.7 % — SIGNIFICANT CHANGE UP (ref 34.5–45)
HGB BLD-MCNC: 12.1 G/DL — SIGNIFICANT CHANGE UP (ref 11.5–15.5)
M TB IFN-G BLD-IMP: NEGATIVE — SIGNIFICANT CHANGE UP
M TB IFN-G CD4+ BCKGRND COR BLD-ACNC: 0 IU/ML — SIGNIFICANT CHANGE UP
M TB IFN-G CD4+CD8+ BCKGRND COR BLD-ACNC: 0 IU/ML — SIGNIFICANT CHANGE UP
MAGNESIUM SERPL-MCNC: 2.2 MG/DL — SIGNIFICANT CHANGE UP (ref 1.6–2.6)
MCHC RBC-ENTMCNC: 29.7 PG — SIGNIFICANT CHANGE UP (ref 27–34)
MCHC RBC-ENTMCNC: 32.1 GM/DL — SIGNIFICANT CHANGE UP (ref 32–36)
MCV RBC AUTO: 92.6 FL — SIGNIFICANT CHANGE UP (ref 80–100)
METHOD TYPE: SIGNIFICANT CHANGE UP
NRBC # BLD: 0 /100 WBCS — SIGNIFICANT CHANGE UP (ref 0–0)
PHOSPHATE SERPL-MCNC: 3.4 MG/DL — SIGNIFICANT CHANGE UP (ref 2.5–4.5)
PLATELET # BLD AUTO: 209 K/UL — SIGNIFICANT CHANGE UP (ref 150–400)
POTASSIUM SERPL-MCNC: 4.3 MMOL/L — SIGNIFICANT CHANGE UP (ref 3.5–5.3)
POTASSIUM SERPL-SCNC: 4.3 MMOL/L — SIGNIFICANT CHANGE UP (ref 3.5–5.3)
PROT SERPL-MCNC: 7.9 G/DL — SIGNIFICANT CHANGE UP (ref 6–8.3)
QUANT TB PLUS MITOGEN MINUS NIL: 3.93 IU/ML — SIGNIFICANT CHANGE UP
RBC # BLD: 4.07 M/UL — SIGNIFICANT CHANGE UP (ref 3.8–5.2)
RBC # FLD: 13.4 % — SIGNIFICANT CHANGE UP (ref 10.3–14.5)
SODIUM SERPL-SCNC: 142 MMOL/L — SIGNIFICANT CHANGE UP (ref 135–145)
WBC # BLD: 4.16 K/UL — SIGNIFICANT CHANGE UP (ref 3.8–10.5)
WBC # FLD AUTO: 4.16 K/UL — SIGNIFICANT CHANGE UP (ref 3.8–10.5)

## 2020-04-05 PROCEDURE — 86480 TB TEST CELL IMMUN MEASURE: CPT

## 2020-04-05 PROCEDURE — 71045 X-RAY EXAM CHEST 1 VIEW: CPT

## 2020-04-05 PROCEDURE — 96374 THER/PROPH/DIAG INJ IV PUSH: CPT

## 2020-04-05 PROCEDURE — 96375 TX/PRO/DX INJ NEW DRUG ADDON: CPT

## 2020-04-05 PROCEDURE — 86803 HEPATITIS C AB TEST: CPT

## 2020-04-05 PROCEDURE — 87150 DNA/RNA AMPLIFIED PROBE: CPT

## 2020-04-05 PROCEDURE — 85025 COMPLETE CBC W/AUTO DIFF WBC: CPT

## 2020-04-05 PROCEDURE — 82330 ASSAY OF CALCIUM: CPT

## 2020-04-05 PROCEDURE — 82728 ASSAY OF FERRITIN: CPT

## 2020-04-05 PROCEDURE — 85027 COMPLETE CBC AUTOMATED: CPT

## 2020-04-05 PROCEDURE — 96361 HYDRATE IV INFUSION ADD-ON: CPT

## 2020-04-05 PROCEDURE — 83615 LACTATE (LD) (LDH) ENZYME: CPT

## 2020-04-05 PROCEDURE — 85379 FIBRIN DEGRADATION QUANT: CPT

## 2020-04-05 PROCEDURE — 87186 SC STD MICRODIL/AGAR DIL: CPT

## 2020-04-05 PROCEDURE — 36415 COLL VENOUS BLD VENIPUNCTURE: CPT

## 2020-04-05 PROCEDURE — 82803 BLOOD GASES ANY COMBINATION: CPT

## 2020-04-05 PROCEDURE — 87633 RESP VIRUS 12-25 TARGETS: CPT

## 2020-04-05 PROCEDURE — 80053 COMPREHEN METABOLIC PANEL: CPT

## 2020-04-05 PROCEDURE — 84295 ASSAY OF SERUM SODIUM: CPT

## 2020-04-05 PROCEDURE — 87040 BLOOD CULTURE FOR BACTERIA: CPT

## 2020-04-05 PROCEDURE — 83735 ASSAY OF MAGNESIUM: CPT

## 2020-04-05 PROCEDURE — 84132 ASSAY OF SERUM POTASSIUM: CPT

## 2020-04-05 PROCEDURE — 99285 EMERGENCY DEPT VISIT HI MDM: CPT | Mod: 25

## 2020-04-05 PROCEDURE — 99239 HOSP IP/OBS DSCHRG MGMT >30: CPT | Mod: GC

## 2020-04-05 PROCEDURE — 86140 C-REACTIVE PROTEIN: CPT

## 2020-04-05 PROCEDURE — 83605 ASSAY OF LACTIC ACID: CPT

## 2020-04-05 PROCEDURE — 94640 AIRWAY INHALATION TREATMENT: CPT

## 2020-04-05 PROCEDURE — 84100 ASSAY OF PHOSPHORUS: CPT

## 2020-04-05 PROCEDURE — 87635 SARS-COV-2 COVID-19 AMP PRB: CPT

## 2020-04-05 PROCEDURE — 84484 ASSAY OF TROPONIN QUANT: CPT

## 2020-04-05 RX ORDER — ACETAMINOPHEN 500 MG
2 TABLET ORAL
Qty: 0 | Refills: 0 | DISCHARGE
Start: 2020-04-05

## 2020-04-05 RX ORDER — HYDROXYCHLOROQUINE SULFATE 200 MG
1 TABLET ORAL
Qty: 3 | Refills: 0
Start: 2020-04-05 | End: 2020-04-06

## 2020-04-05 RX ADMIN — Medication 200 MILLIGRAM(S): at 05:47

## 2020-04-05 RX ADMIN — ENOXAPARIN SODIUM 40 MILLIGRAM(S): 100 INJECTION SUBCUTANEOUS at 10:37

## 2020-04-05 RX ADMIN — FAMOTIDINE 20 MILLIGRAM(S): 10 INJECTION INTRAVENOUS at 10:37

## 2020-04-05 RX ADMIN — AZITHROMYCIN 250 MILLIGRAM(S): 500 TABLET, FILM COATED ORAL at 10:37

## 2020-04-05 RX ADMIN — Medication 100 MILLIGRAM(S): at 05:55

## 2020-04-05 NOTE — PROGRESS NOTE ADULT - ASSESSMENT
Patient is a 55F no PMH, positive for COVID-19.  Started on COVID Quad Therapy, Ascorbic Acid 1500mg Q6, Thiamine 200mg BID, Azithromycin 250 Daily, and Plaquenil 400mg BID loading dose and 200mg BIDx 4 days. She is requiring increased O2, saturated well (96%) on 4 L 4/2, but am 4/3 was brought up to 10 L nonrebreather in order to maintain O2 saturation above 94%.
Patient is a 55F no PMH, positive for COVID-19.  Started on COVID Quad Therapy, Ascorbic Acid 1500mg Q6, Thiamine 200mg BID, Azithromycin 250 Daily, and Plaquenil 400mg BID loading dose and 200mg BIDx 4 days. She is requiring increased O2, saturated well (96%) on 4 L 4/2, but am 4/3 was brought up to 10 L nonrebreather in order to maintain O2 saturation above 94%.
Patient is a 55F no PMH, positive for COVID-19.  Started on COVID Quad Therapy, Ascorbic Acid 1500mg Q6, Thiamine 200mg BID, Azithromycin 250 Daily, and Plaquenil 400mg BID loading dose and 200mg BIDx 4 days. She is requiring increased O2, saturated well (96%) on 4 L 4/2, but am 4/3 was brought up to 10 L nonrebreather in order to maintain O2 saturation above 94%, 4/5 was brought down from 6L to 3L saturating 95%.

## 2020-04-05 NOTE — DISCHARGE NOTE NURSING/CASE MANAGEMENT/SOCIAL WORK - PATIENT PORTAL LINK FT
You can access the FollowMyHealth Patient Portal offered by [facility] by registering at the following website: [email or web address]. By joining [employer]’s FollowMyHealth portal, you will also be able to view your health information using other applications (apps) compatible with our system.

## 2020-04-05 NOTE — PROGRESS NOTE ADULT - PROBLEM SELECTOR PLAN 4
-pt with persistently elevated RR despite normal O2 sats, very anxious 4/4    - anxiety seems much better 4/5    DISPO: home, pending wean off O2  VTE ppx: lovenox
-pt with persistently elevated RR despite normal O2 sats, very anxious  -will give klonopin 0.5mg and monitor for improvements

## 2020-04-05 NOTE — PROGRESS NOTE ADULT - PROBLEM SELECTOR PLAN 3
Continue to monitor, 3 episodes 4/3, no episodes this am    - encourage PO fluids    - no indication for c diff testing at this time
Continue to monitor, 3 episodes yesterday    - encourage PO fluids    - no indication for c diff testing at this time
Continue to monitor, 3 episodes 4/3, no episodes since    - encourage PO fluids    - no indication for c diff testing at this time

## 2020-04-05 NOTE — PROGRESS NOTE ADULT - PROBLEM SELECTOR PLAN 2
-Monitor O2 sat continuously  -weaned to 3L NC at 95%, continue to wean off O2  -RR cont to be elevated despite adequate O2 sats 4/4, required klonopin for anxiety, resolved 4/5, continue to monitor RR

## 2020-04-05 NOTE — PROGRESS NOTE ADULT - SUBJECTIVE AND OBJECTIVE BOX
*** NOTE IN PROGRESS ***    INTERVAL HPI/OVERNIGHT EVENTS:    SUBJECTIVE: Patient seen and examined at bedside. Complains of 7/10 chest heaviness that makes her "feel like I have to cough". Shortness of breath is improved, 97% on 6L NC, brought down to 3L NC, still saturating 94-95%. Complaining of fatigue. Denies any fever, chills, headaches, myalgias, abdominal pain, or n/v/d.     OBJECTIVE:    VITAL SIGNS:  ICU Vital Signs Last 24 Hrs  T(C): 36.6 (04 Apr 2020 22:30), Max: 36.8 (04 Apr 2020 11:00)  T(F): 97.8 (04 Apr 2020 22:30), Max: 98.2 (04 Apr 2020 11:00)  HR: 74 (04 Apr 2020 22:30) (74 - 81)  BP: 132/84 (04 Apr 2020 22:30) (116/76 - 132/84)  BP(mean): --  ABP: --  ABP(mean): --  RR: 20 (04 Apr 2020 22:30) (20 - 24)  SpO2: 95% (04 Apr 2020 22:30) (94% - 96%)            PHYSICAL EXAM:    General: NAD  HEENT: NC/AT; EMOI, clear conjunctiva  Neck: supple  Respiratory: CTA b/l  Cardiovascular: +S1/S2; RRR  Abdomen: soft, NT/ND; +BS x4  Extremities: WWP, 2+ peripheral pulses b/l; no LE edema  Skin: normal color and turgor; no rash  Neurological: A&O x 3. No focal deficits    MEDICATIONS:  MEDICATIONS  (STANDING):  enoxaparin Injectable 40 milliGRAM(s) SubCutaneous daily  famotidine    Tablet 20 milliGRAM(s) Oral daily  hydroxychloroquine   Oral   hydroxychloroquine 200 milliGRAM(s) Oral every 12 hours    MEDICATIONS  (PRN):  acetaminophen   Tablet .. 650 milliGRAM(s) Oral every 6 hours PRN Temp greater or equal to 38C (100.4F), Mild Pain (1 - 3)  ALBUTerol    90 MICROgram(s) HFA Inhaler 2 Puff(s) Inhalation every 6 hours PRN Shortness of Breath and/or Wheezing  benzonatate 100 milliGRAM(s) Oral every 8 hours PRN Cough  guaiFENesin   Syrup  (Sugar-Free) 100 milliGRAM(s) Oral every 6 hours PRN Cough      ALLERGIES:  Allergies    No Known Allergies    Intolerances        LABS:                        12.1   4.16  )-----------( 209      ( 05 Apr 2020 10:11 )             37.7     04-04    142  |  101  |  9   ----------------------------<  95  4.1   |  27  |  0.65    Ca    9.0      04 Apr 2020 07:27  Phos  3.7     04-04  Mg     2.2     04-04    TPro  7.4  /  Alb  3.7  /  TBili  0.5  /  DBili  x   /  AST  61<H>  /  ALT  86<H>  /  AlkPhos  75  04-04          Procalcitonin:   D-dimer: D-Dimer Assay, Quantitative: 979 ng/mL DDU (04-05-20 @ 10:11)  D-Dimer Assay, Quantitative: 431 ng/mL DDU (04-04-20 @ 07:27)  D-Dimer Assay, Quantitative: 237 ng/mL DDU (04-03-20 @ 14:14)    ESR:   CRP: C-Reactive Protein, Serum: 15.31 mg/dL (04-04-20 @ 07:27)  C-Reactive Protein, Serum: 19.53 mg/dL (04-03-20 @ 14:49)    LDH:   Ferritin: Ferritin, Serum: 981 ng/mL (04-04-20 @ 07:27)  Ferritin, Serum: 994 ng/mL (04-03-20 @ 14:47)    Lactate: lc  Trop I:   Ck:       COVID Labs  Full T cell subset:   G6PD:   Immunoglobulins panel:   Quantiferon Gold Tb:   Triglyceride level:               RADIOLOGY & ADDITIONAL TESTS: Reviewed.

## 2020-04-05 NOTE — PROGRESS NOTE ADULT - PROBLEM SELECTOR PROBLEM 2
Respiratory failure with hypoxia, unspecified chronicity

## 2020-04-06 LAB
CULTURE RESULTS: NO GROWTH — SIGNIFICANT CHANGE UP
SPECIMEN SOURCE: SIGNIFICANT CHANGE UP

## 2020-04-09 LAB
CULTURE RESULTS: SIGNIFICANT CHANGE UP
ORGANISM # SPEC MICROSCOPIC CNT: SIGNIFICANT CHANGE UP
SPECIMEN SOURCE: SIGNIFICANT CHANGE UP

## 2020-11-27 NOTE — ED ADULT NURSE REASSESSMENT NOTE - NS ED NURSE REASSESS COMMENT FT1
Patient called and stated that she is suppose to have a follow up EGD within 2 weeks with Dr. Melton. Please review Dr. Barnard note from 11/18/2020   initial admit orders received, awaiting s/o and bed assignment, pt. utd on poc, with understanding verbalized

## 2021-08-09 NOTE — PROGRESS NOTE ADULT - SUBJECTIVE AND OBJECTIVE BOX
Problem: At Risk for Falls  Goal: # Patient does not fall  Outcome: Outcome Met, Continue evaluating goal progress toward completion  Goal: # Takes action to control fall-related risks  Outcome: Outcome Met, Continue evaluating goal progress toward completion  Goal: # Verbalizes understanding of fall risk/precautions  Description: Document education using the patient education activity  Outcome: Outcome Met, Continue evaluating goal progress toward completion     Problem: At Risk for Injury Due to Fall  Goal: # Patient does not fall  Outcome: Outcome Met, Continue evaluating goal progress toward completion  Goal: # Takes action to control condition specific risks  Outcome: Outcome Met, Continue evaluating goal progress toward completion  Goal: # Verbalizes understanding of fall-related injury personal risks  Description: Document education using the patient education activity  Outcome: Outcome Met, Continue evaluating goal progress toward completion     Problem: Fluid Volume Excess, Risk for  Goal: # Absence of Rapid Weight Gain (no more than 2kg in 24 hours)  Description: FVE Risk Patients may gain weight (but not more than 2 kg) but may not require aggressive treatment if in the absence of dyspnea; FVE (actual) patients should be monitored to achieve no weight gain.   Outcome: Outcome Met, Continue evaluating goal progress toward completion  Goal: # Absence of New Onset Dyspnea  Description: Dyspnea greater than SOB with Activity may be indicator of fluid volume excess  Outcome: Outcome Met, Continue evaluating goal progress toward completion  Goal: # Verbalizes understanding of FVE prevention plan  Description: Document on Patient Education Activity  Outcome: Outcome Met, Continue evaluating goal progress toward completion     Problem: Fluid Volume Excess  Goal: # Fluid Volume Excess Symptoms Resolved  Description: Treatment often consists of oxygen and respiratory support with diuretic therapy at doses that  *** NOTE IN PROGRESS ***    INTERVAL HPI/OVERNIGHT EVENTS:    SUBJECTIVE: Patient seen and examined at bedside. She was tachypneic and SOB, O2 sat 86% on 4L NC. O2 requirements increased to needing non-rebreather at 10L to O2 sat 96%. She had 2-3 episodes of diarrhea yesterday. Requests cough medicine and complaining of plueritic chest pain. Denies any fever, chills, headaches, myalgias, abdominal pain, or n/v.     OBJECTIVE:    VITAL SIGNS:  ICU Vital Signs Last 24 Hrs  T(C): 37.5 (03 Apr 2020 09:01), Max: 39.5 (02 Apr 2020 20:26)  T(F): 99.5 (03 Apr 2020 09:01), Max: 103.1 (02 Apr 2020 20:26)  HR: 79 (03 Apr 2020 09:01) (79 - 99)  BP: 107/70 (03 Apr 2020 09:01) (101/64 - 128/72)  BP(mean): --  ABP: --  ABP(mean): --  RR: 22 (03 Apr 2020 09:01) (20 - 22)  SpO2: 96% (03 Apr 2020 09:02) (89% - 99%)        CAPILLARY BLOOD GLUCOSE          PHYSICAL EXAM:    General: NAD  HEENT: NC/AT; EMOI, clear conjunctiva  Neck: supple  Respiratory: Crackles throughout, otherwise CTAB, tachypneic, shallow breathing  Cardiovascular: +S1/S2; RRR  Abdomen: soft, NT/ND; +BS x4  Extremities: WWP, 2+ peripheral pulses b/l; no LE edema  Skin: normal color and turgor; no rash  Neurological: A&O x3, no focal deficits    MEDICATIONS:  MEDICATIONS  (STANDING):  ascorbic acid 1500 milliGRAM(s) Oral <User Schedule>  azithromycin   Tablet 250 milliGRAM(s) Oral daily  enoxaparin Injectable 40 milliGRAM(s) SubCutaneous daily  famotidine    Tablet 20 milliGRAM(s) Oral daily  hydroxychloroquine   Oral   hydroxychloroquine 200 milliGRAM(s) Oral every 12 hours  thiamine 200 milliGRAM(s) Oral <User Schedule>    MEDICATIONS  (PRN):  acetaminophen   Tablet .. 650 milliGRAM(s) Oral every 6 hours PRN Temp greater or equal to 38C (100.4F), Mild Pain (1 - 3)  ALBUTerol    90 MICROgram(s) HFA Inhaler 2 Puff(s) Inhalation every 6 hours PRN Shortness of Breath and/or Wheezing  guaiFENesin   Syrup  (Sugar-Free) 100 milliGRAM(s) Oral every 6 hours PRN Cough      ALLERGIES:  Allergies    No Known Allergies    Intolerances        LABS:                        11.5   4.25  )-----------( 107      ( 02 Apr 2020 06:21 )             36.3     04-02    138  |  102  |  8   ----------------------------<  167<H>  3.8   |  24  |  0.63    Ca    8.0<L>      02 Apr 2020 06:21  Phos  3.1     04-02  Mg     2.0     04-02    TPro  7.0  /  Alb  3.3  /  TBili  0.5  /  DBili  x   /  AST  107<H>  /  ALT  139<H>  /  AlkPhos  66  04-02          Procalcitonin:   D-dimer: D-Dimer Assay, Quantitative: 217 ng/mL DDU (04-02-20 @ 06:21)  D-Dimer Assay, Quantitative: 217 ng/mL DDU (04-02-20 @ 02:26)    ESR:   CRP: C-Reactive Protein, Serum: 10.57 mg/dL (04-02-20 @ 06:21)    LDH: Lactate Dehydrogenase, Serum: 393 U/L (04-02-20 @ 01:57)    Ferritin: Ferritin, Serum: 1279 ng/mL (04-02-20 @ 06:21)  Ferritin, Serum: 1656 ng/mL (04-02-20 @ 02:26)      Culture - Blood (collected 04-02-20 @ 06:41)  Source: .Blood Blood  Preliminary Report (04-03-20 @ 07:00):    No growth at 1 day.    Culture - Blood (collected 04-02-20 @ 06:41)  Source: .Blood Blood  Preliminary Report (04-03-20 @ 07:00):    No growth at 1 day.            RADIOLOGY & ADDITIONAL TESTS: Reviewed. exceed usual dose (typically doubled).  Monitor patient response to treatment.    Acute dyspnea should resolve quickly if dose is adequate and kidney function is adequate. Dyspnea/SOB should only be observed with Activity after effective treatment. Patient should be able to lie down comfortably, without SOB.  Outcome: Outcome Met, Continue evaluating goal progress toward completion  Goal: # Oxygenation is maintained (SpO2 greater than or equal to 90% or as ordered)  Outcome: Outcome Met, Continue evaluating goal progress toward completion  Goal: # Verbalizes understanding of FVE management plan  Description: Document on Patient Education Activity  Outcome: Outcome Met, Continue evaluating goal progress toward completion     Problem: Diabetes  Goal: Glycemic balance achieved/maintained  Description: Goal is to maintain blood sugar within range with no episodes of hypoglycemia  Outcome: Outcome Met, Continue evaluating goal progress toward completion  Goal: Verbalizes/demonstrates understanding of Diabetes  Description: Document on Patient Education Activity  Outcome: Outcome Met, Continue evaluating goal progress toward completion  Goal: Demonstrates ability to self-administer insulin  Description: Document on Patient Education Activity  Outcome: Outcome Met, Continue evaluating goal progress toward completion      INTERVAL HPI/OVERNIGHT EVENTS: none    SUBJECTIVE: Patient seen and examined at bedside. She was tachypneic and SOB, O2 sat 86% on 4L NC. O2 requirements increased to needing non-rebreather at 10L to O2 sat 96%. She had 2-3 episodes of diarrhea yesterday. Requests cough medicine and complaining of plueritic chest pain. Denies any fever, chills, headaches, myalgias, abdominal pain, or n/v.     OBJECTIVE:    VITAL SIGNS:  ICU Vital Signs Last 24 Hrs  T(C): 37.5 (03 Apr 2020 09:01), Max: 39.5 (02 Apr 2020 20:26)  T(F): 99.5 (03 Apr 2020 09:01), Max: 103.1 (02 Apr 2020 20:26)  HR: 79 (03 Apr 2020 09:01) (79 - 99)  BP: 107/70 (03 Apr 2020 09:01) (101/64 - 128/72)  BP(mean): --  ABP: --  ABP(mean): --  RR: 22 (03 Apr 2020 09:01) (20 - 22)  SpO2: 96% (03 Apr 2020 09:02) (89% - 99%)        CAPILLARY BLOOD GLUCOSE          PHYSICAL EXAM:    General: NAD  HEENT: NC/AT; EMOI, clear conjunctiva  Neck: supple  Respiratory: Crackles throughout, otherwise CTAB, tachypneic, shallow breathing  Cardiovascular: +S1/S2; RRR  Abdomen: soft, NT/ND; +BS x4  Extremities: WWP, 2+ peripheral pulses b/l; no LE edema  Skin: normal color and turgor; no rash  Neurological: A&O x3, no focal deficits    MEDICATIONS:  MEDICATIONS  (STANDING):  ascorbic acid 1500 milliGRAM(s) Oral <User Schedule>  azithromycin   Tablet 250 milliGRAM(s) Oral daily  enoxaparin Injectable 40 milliGRAM(s) SubCutaneous daily  famotidine    Tablet 20 milliGRAM(s) Oral daily  hydroxychloroquine   Oral   hydroxychloroquine 200 milliGRAM(s) Oral every 12 hours  thiamine 200 milliGRAM(s) Oral <User Schedule>    MEDICATIONS  (PRN):  acetaminophen   Tablet .. 650 milliGRAM(s) Oral every 6 hours PRN Temp greater or equal to 38C (100.4F), Mild Pain (1 - 3)  ALBUTerol    90 MICROgram(s) HFA Inhaler 2 Puff(s) Inhalation every 6 hours PRN Shortness of Breath and/or Wheezing  guaiFENesin   Syrup  (Sugar-Free) 100 milliGRAM(s) Oral every 6 hours PRN Cough      ALLERGIES:  Allergies    No Known Allergies    Intolerances        LABS:                        11.5   4.25  )-----------( 107      ( 02 Apr 2020 06:21 )             36.3     04-02    138  |  102  |  8   ----------------------------<  167<H>  3.8   |  24  |  0.63    Ca    8.0<L>      02 Apr 2020 06:21  Phos  3.1     04-02  Mg     2.0     04-02    TPro  7.0  /  Alb  3.3  /  TBili  0.5  /  DBili  x   /  AST  107<H>  /  ALT  139<H>  /  AlkPhos  66  04-02          Procalcitonin:   D-dimer: D-Dimer Assay, Quantitative: 217 ng/mL DDU (04-02-20 @ 06:21)  D-Dimer Assay, Quantitative: 217 ng/mL DDU (04-02-20 @ 02:26)    ESR:   CRP: C-Reactive Protein, Serum: 10.57 mg/dL (04-02-20 @ 06:21)    LDH: Lactate Dehydrogenase, Serum: 393 U/L (04-02-20 @ 01:57)    Ferritin: Ferritin, Serum: 1279 ng/mL (04-02-20 @ 06:21)  Ferritin, Serum: 1656 ng/mL (04-02-20 @ 02:26)      Culture - Blood (collected 04-02-20 @ 06:41)  Source: .Blood Blood  Preliminary Report (04-03-20 @ 07:00):    No growth at 1 day.    Culture - Blood (collected 04-02-20 @ 06:41)  Source: .Blood Blood  Preliminary Report (04-03-20 @ 07:00):    No growth at 1 day.            RADIOLOGY & ADDITIONAL TESTS: Reviewed.
